# Patient Record
Sex: MALE | HISPANIC OR LATINO | Employment: UNEMPLOYED | URBAN - METROPOLITAN AREA
[De-identification: names, ages, dates, MRNs, and addresses within clinical notes are randomized per-mention and may not be internally consistent; named-entity substitution may affect disease eponyms.]

---
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
## 2024-09-06 NOTE — ED PROCEDURE NOTE - PROCEDURE DATE TIME, MLM
Off AC has a watchman. Has been seen by EP refuses oral antiarrhythmic therapy. Rate controlled.   
08-Jul-2017 22:58
For information on Fall & Injury Prevention, visit: [email or web address]  [email or web address] OR  https://www.cdc.gov/steadi/patient.html